# Patient Record
Sex: FEMALE | Race: WHITE | NOT HISPANIC OR LATINO | URBAN - METROPOLITAN AREA
[De-identification: names, ages, dates, MRNs, and addresses within clinical notes are randomized per-mention and may not be internally consistent; named-entity substitution may affect disease eponyms.]

---

## 2020-05-23 ENCOUNTER — Encounter (OUTPATIENT)
Dept: URBAN - METROPOLITAN AREA CLINIC 17 | Facility: CLINIC | Age: 40
End: 2020-05-23
Payer: COMMERCIAL

## 2020-05-23 PROCEDURE — 92004 COMPRE OPH EXAM NEW PT 1/>: CPT | Performed by: OPTOMETRIST

## 2020-05-23 ASSESSMENT — INTRAOCULAR PRESSURE
OS: 19
OD: 20

## 2020-05-26 ENCOUNTER — OFFICE VISIT (OUTPATIENT)
Dept: URBAN - METROPOLITAN AREA CLINIC 33 | Facility: CLINIC | Age: 40
End: 2020-05-26
Payer: COMMERCIAL

## 2020-05-26 ENCOUNTER — SURGERY (OUTPATIENT)
Dept: URBAN - METROPOLITAN AREA SURGERY 15 | Facility: SURGERY | Age: 40
End: 2020-05-26
Payer: COMMERCIAL

## 2020-05-26 PROCEDURE — 92134 CPTRZ OPH DX IMG PST SGM RTA: CPT | Performed by: OPHTHALMOLOGY

## 2020-05-26 PROCEDURE — 67108 REPAIR DETACHED RETINA: CPT | Performed by: OPHTHALMOLOGY

## 2020-05-26 PROCEDURE — 99203 OFFICE O/P NEW LOW 30 MIN: CPT | Performed by: OPHTHALMOLOGY

## 2020-05-26 PROCEDURE — 99214 OFFICE O/P EST MOD 30 MIN: CPT | Performed by: OPHTHALMOLOGY

## 2020-05-26 RX ORDER — PREDNISOLONE ACETATE 10 MG/ML
1 % SUSPENSION/ DROPS OPHTHALMIC
Qty: 10 | Refills: 2 | Status: INACTIVE
Start: 2020-05-26 | End: 2020-09-23

## 2020-05-26 RX ORDER — OFLOXACIN 3 MG/ML
0.3 % SOLUTION/ DROPS OPHTHALMIC
Qty: 10 | Refills: 3 | Status: INACTIVE
Start: 2020-05-26 | End: 2020-09-23

## 2020-05-26 RX ORDER — HYDROCODONE BITARTRATE AND ACETAMINOPHEN 5; 325 MG/1; MG/1
TABLET ORAL
Qty: 6 | Refills: 0 | Status: INACTIVE
Start: 2020-05-26 | End: 2020-09-23

## 2020-05-26 ASSESSMENT — INTRAOCULAR PRESSURE
OS: 18
OD: 20
OS: 18
OD: 20

## 2020-05-26 NOTE — IMPRESSION/PLAN
Impression: Retinal detachment with multiple breaks, right eye: H33.021. Plan: OCT ordered and performed today. Discussed diagnosis with patient. The clinical exam is consistent retinal detachment. To reduce the risk of further vision loss,  surgical intervention is strongly recommended. Discussed treatment options, the various techniques to repair an RD discussed with patient including scleral buckle, silicone oil and pneumatic retinopexy. In addition altitude with gas bubble were discussed. Recommend sx, after a through discussion of surgical R/B/A. The patient understands the potential risks of sx, including (but not limited to) bleeding, pain, infection, loss of vision, loss of eye and possible need for more sx. The patient also understands that pre detachment visual acuity may not return.  The patient elects to proceed with sx RL-2

## 2020-05-27 ENCOUNTER — POST-OPERATIVE VISIT (OUTPATIENT)
Dept: URBAN - METROPOLITAN AREA CLINIC 18 | Facility: CLINIC | Age: 40
End: 2020-05-27

## 2020-05-27 ASSESSMENT — INTRAOCULAR PRESSURE
OD: 20
OS: 15

## 2020-06-02 ENCOUNTER — POST-OPERATIVE VISIT (OUTPATIENT)
Dept: URBAN - METROPOLITAN AREA CLINIC 33 | Facility: CLINIC | Age: 40
End: 2020-06-02

## 2020-06-02 PROCEDURE — 99024 POSTOP FOLLOW-UP VISIT: CPT | Performed by: OPHTHALMOLOGY

## 2020-06-02 ASSESSMENT — INTRAOCULAR PRESSURE
OS: 14
OD: 26

## 2020-07-07 ENCOUNTER — POST-OPERATIVE VISIT (OUTPATIENT)
Dept: URBAN - METROPOLITAN AREA CLINIC 33 | Facility: CLINIC | Age: 40
End: 2020-07-07

## 2020-07-07 DIAGNOSIS — Z09 ENCNTR FOR F/U EXAM AFT TRTMT FOR COND OTH THAN MALIG NEOPLM: Primary | ICD-10-CM

## 2020-07-07 PROCEDURE — 99024 POSTOP FOLLOW-UP VISIT: CPT | Performed by: OPHTHALMOLOGY

## 2020-07-07 ASSESSMENT — INTRAOCULAR PRESSURE
OD: 22
OS: 16

## 2020-07-30 ENCOUNTER — POST-OPERATIVE VISIT (OUTPATIENT)
Dept: URBAN - METROPOLITAN AREA CLINIC 17 | Facility: CLINIC | Age: 40
End: 2020-07-30

## 2020-07-30 PROCEDURE — 99024 POSTOP FOLLOW-UP VISIT: CPT | Performed by: OPHTHALMOLOGY

## 2020-07-30 ASSESSMENT — INTRAOCULAR PRESSURE
OS: 13
OD: 14

## 2020-09-23 ENCOUNTER — OFFICE VISIT (OUTPATIENT)
Dept: URBAN - METROPOLITAN AREA CLINIC 18 | Facility: CLINIC | Age: 40
End: 2020-09-23
Payer: COMMERCIAL

## 2020-09-23 DIAGNOSIS — Z96.1 PRESENCE OF INTRAOCULAR LENS: Chronic | ICD-10-CM

## 2020-09-23 DIAGNOSIS — H04.123 DRY EYE SYNDROME OF BILATERAL LACRIMAL GLANDS: Chronic | ICD-10-CM

## 2020-09-23 PROCEDURE — 92134 CPTRZ OPH DX IMG PST SGM RTA: CPT | Performed by: OPTOMETRIST

## 2020-09-23 PROCEDURE — 99214 OFFICE O/P EST MOD 30 MIN: CPT | Performed by: OPTOMETRIST

## 2020-09-23 ASSESSMENT — INTRAOCULAR PRESSURE
OD: 17
OS: 17

## 2020-09-23 ASSESSMENT — KERATOMETRY
OD: 41.00
OS: 40.75

## 2020-09-23 NOTE — IMPRESSION/PLAN
Impression: Retinal detachment with multiple breaks, right eye: H33.021. Plan: inferior detachment with SRF; next day retinal referral as patient is still 20/40.

## 2020-09-23 NOTE — IMPRESSION/PLAN
Impression: Presence of intraocular lens: Z96.1. Right. Plan: Discussed diagnosis in detail with patient. No treatment is required at this time. Will continue to observe condition and or symptoms. Patient instructed to call if condition gets worse.  Order Refraction per patient request.

## 2020-09-24 ENCOUNTER — OFFICE VISIT (OUTPATIENT)
Dept: URBAN - METROPOLITAN AREA CLINIC 17 | Facility: CLINIC | Age: 40
End: 2020-09-24
Payer: COMMERCIAL

## 2020-09-24 DIAGNOSIS — H33.011 RETINAL DETACHMENT WITH SINGLE BREAK, RIGHT EYE: Primary | ICD-10-CM

## 2020-09-24 PROCEDURE — 92134 CPTRZ OPH DX IMG PST SGM RTA: CPT | Performed by: OPHTHALMOLOGY

## 2020-09-24 PROCEDURE — 92014 COMPRE OPH EXAM EST PT 1/>: CPT | Performed by: OPHTHALMOLOGY

## 2020-09-24 RX ORDER — OFLOXACIN 3 MG/ML
0.3 % SOLUTION/ DROPS OPHTHALMIC
Qty: 5 | Refills: 3 | Status: INACTIVE
Start: 2020-09-24 | End: 2020-10-22

## 2020-09-24 RX ORDER — PREDNISOLONE ACETATE 10 MG/ML
1 % SUSPENSION/ DROPS OPHTHALMIC
Qty: 10 | Refills: 5 | Status: INACTIVE
Start: 2020-09-24 | End: 2020-11-03

## 2020-09-24 ASSESSMENT — INTRAOCULAR PRESSURE
OS: 18
OD: 21

## 2020-09-24 NOTE — IMPRESSION/PLAN
Impression: Retinal detachment with single break, right eye: H33.011. Right. Condition: new problem addtl w/u needed. Vision: vision affected. s/p Repair of RD OD w/gas 05/26/2020 w/Dr Riley Plan: Discussed diagnosis in detail with patient. Discussed risks of progression. Surgical treatment is recommended to repair the retina PPVx RIGHT EYE. Surgical risks and benefits were discussed, explained and understood by patient. Unable to tell how much vision will be recovered. Discussed possible gas bubble and post-op care: no traveling, flying or high altitude for approximately 6 - 8 weeks. Most likely will use Pefl due to the location of RD. Advise patient she will need additional surgery OD to remove the heavy liquid in the future once the retina is stable. All questions answered. Patient elects to proceed with recommendation. RL1. Educational material provided to patient. Will schedule surgery for next Tuesday 09/29/2020. Erx Prednisolone and Ofloxacin to patient's pharmacy. Optos OD shows shallow inf RD and OCT OD shows fluid, mac on. Advise patient that she will not be able to see after the patch is removed on 1 day post-op. Patient asked if she can go to [facility], trip has been planned for a while for her and friends to relax an not to party. Ok to go as long as she is resting and/or not harsh head movement.

## 2020-09-29 ENCOUNTER — SURGERY (OUTPATIENT)
Dept: URBAN - METROPOLITAN AREA SURGERY 11 | Facility: SURGERY | Age: 40
End: 2020-09-29
Payer: COMMERCIAL

## 2020-09-29 PROCEDURE — 67041 VIT FOR MACULAR PUCKER: CPT | Performed by: OPHTHALMOLOGY

## 2020-09-30 ENCOUNTER — POST-OPERATIVE VISIT (OUTPATIENT)
Dept: URBAN - METROPOLITAN AREA CLINIC 17 | Facility: CLINIC | Age: 40
End: 2020-09-30
Payer: COMMERCIAL

## 2020-09-30 PROCEDURE — 99024 POSTOP FOLLOW-UP VISIT: CPT | Performed by: OPTOMETRIST

## 2020-09-30 ASSESSMENT — INTRAOCULAR PRESSURE
OD: 19
OD: 25
OS: 17

## 2020-09-30 NOTE — IMPRESSION/PLAN
Impression: S/P retinal detachment w/VTX(PPVx), EL, ERMx, perfluoron OD - 1 Day. Encounter for surgical aftercare following surgery on a sense organ  Z48.810. Post operative instructions reviewed - If patient has increase in pain recommend calling to be seen sooner.  Plan: RTC in 1 week for PO2 --Continue Ofloxacin 0.3% QID OD x week then d/c
--Continue Prednisolone acetate 1% QID OD until bottle is gone

## 2020-10-07 ENCOUNTER — POST-OPERATIVE VISIT (OUTPATIENT)
Dept: URBAN - METROPOLITAN AREA CLINIC 17 | Facility: CLINIC | Age: 40
End: 2020-10-07
Payer: COMMERCIAL

## 2020-10-07 PROCEDURE — 99024 POSTOP FOLLOW-UP VISIT: CPT | Performed by: OPTOMETRIST

## 2020-10-07 ASSESSMENT — INTRAOCULAR PRESSURE
OS: 14
OD: 18

## 2020-10-07 NOTE — IMPRESSION/PLAN
Impression: S/P retinal detachment w/VTX(PPVx), EL, ERMx, perfluoron OD - 8 Days. Encounter for surgical aftercare following surgery on a sense organ  Z48.810.  Condition is improving - Plan: RTC in 4-6 weeks with Dr. Gini Fleming 
--Discontinue Ofloxacin 0.3%
--Continue Prednisolone acetate 1% QID until bottle and refill is gone

## 2020-10-22 ENCOUNTER — POST-OPERATIVE VISIT (OUTPATIENT)
Dept: URBAN - METROPOLITAN AREA CLINIC 17 | Facility: CLINIC | Age: 40
End: 2020-10-22
Payer: COMMERCIAL

## 2020-10-22 PROCEDURE — 99024 POSTOP FOLLOW-UP VISIT: CPT | Performed by: OPTOMETRIST

## 2020-10-22 ASSESSMENT — INTRAOCULAR PRESSURE
OD: 22
OS: 14

## 2020-10-22 NOTE — IMPRESSION/PLAN
Impression: S/P retinal detachment w/VTX(PPVx), EL, ERMx, perfluoron OD - 23 Days. Encounter for surgical aftercare following surgery on a sense organ  Z48.810. Post operative instructions reviewed - Consulted with Dr. Charli Barrera in office. Continue care as directed. Pressure may be related to sinus. IOP doing well today. Patient instructed to call if pain increases.  Plan: Keep appt with Dr. Charli Barrera --Continue Prednisolone acetate 1% QID OD until bottle and refill is gone

## 2020-11-03 ENCOUNTER — POST-OPERATIVE VISIT (OUTPATIENT)
Dept: URBAN - METROPOLITAN AREA CLINIC 17 | Facility: CLINIC | Age: 40
End: 2020-11-03
Payer: COMMERCIAL

## 2020-11-03 DIAGNOSIS — Z48.810 ENCOUNTER FOR SURGICAL AFTERCARE FOLLOWING SURGERY ON A SENSE ORGAN: Primary | ICD-10-CM

## 2020-11-03 PROCEDURE — 99024 POSTOP FOLLOW-UP VISIT: CPT | Performed by: OPHTHALMOLOGY

## 2020-11-03 RX ORDER — OFLOXACIN 3 MG/ML
0.3 % SOLUTION/ DROPS OPHTHALMIC
Qty: 5 | Refills: 3 | Status: INACTIVE
Start: 2020-11-03 | End: 2020-11-11

## 2020-11-03 ASSESSMENT — INTRAOCULAR PRESSURE
OS: 16
OD: 26

## 2020-11-03 NOTE — IMPRESSION/PLAN
Impression: S/P retinal detachment w/VTX(PPVx), EL, ERMx, perfluoron OD - 35 Days. Encounter for surgical aftercare following surgery on a sense organ  Z48.810. Excellent post op course   Post operative instructions reviewed - Condition is improving - Plan: Exam of the right eye today shows retina fully reattached with PFO. Discussed diagnosis in detail with patient. Discussed risks of progression. Surgical treatment is recommended to remove PFO for vision improvement 27 g PPVx w/ PFO removal RIGHT EYE. Surgical risks and benefits were discussed, explained and understood by patient. All questions answered. RL1. Patient elects to proceed with recommendation. OCT performed today: retina attached with PFO OD. Erxed drops to pharmacy on file Ofloxacin and Prednisolone

## 2020-11-11 ENCOUNTER — SURGERY (OUTPATIENT)
Dept: URBAN - METROPOLITAN AREA SURGERY 11 | Facility: SURGERY | Age: 40
End: 2020-11-11
Payer: COMMERCIAL

## 2020-11-11 ENCOUNTER — POST-OPERATIVE VISIT (OUTPATIENT)
Dept: URBAN - METROPOLITAN AREA CLINIC 18 | Facility: CLINIC | Age: 40
End: 2020-11-11
Payer: COMMERCIAL

## 2020-11-11 ENCOUNTER — POST-OPERATIVE VISIT (OUTPATIENT)
Dept: URBAN - METROPOLITAN AREA CLINIC 23 | Facility: CLINIC | Age: 40
End: 2020-11-11
Payer: COMMERCIAL

## 2020-11-11 ENCOUNTER — POST-OPERATIVE VISIT (OUTPATIENT)
Dept: URBAN - METROPOLITAN AREA CLINIC 17 | Facility: CLINIC | Age: 40
End: 2020-11-11
Payer: COMMERCIAL

## 2020-11-11 PROCEDURE — 67036 REMOVAL OF INNER EYE FLUID: CPT | Performed by: OPHTHALMOLOGY

## 2020-11-11 PROCEDURE — 99024 POSTOP FOLLOW-UP VISIT: CPT | Performed by: OPHTHALMOLOGY

## 2020-11-11 PROCEDURE — 99214 OFFICE O/P EST MOD 30 MIN: CPT | Performed by: OPHTHALMOLOGY

## 2020-11-11 PROCEDURE — 99024 POSTOP FOLLOW-UP VISIT: CPT | Performed by: OPTOMETRIST

## 2020-11-11 ASSESSMENT — INTRAOCULAR PRESSURE
OD: 41
OS: 20
OS: 13
OS: 13
OS: 20
OD: 41
OD: 59
OS: 13
OD: 59
OS: 20
OS: 20
OD: 41
OD: 59
OD: 41
OD: 59
OS: 13

## 2020-11-11 NOTE — IMPRESSION/PLAN
Impression: S/P retinal detachment w/VTX(PPVx), EL, ERMx, perfluoron OD - 43 Days. Encounter for surgical aftercare following surgery on a sense organ  Z48.810. Excellent post op course   Post operative instructions reviewed - Condition is improving - Plan: Exam of the right eye shows elevated IOP, retina appears fully reattached with PFO. Discussed with patient that the heavy liquid is probably the cause of the elevated pressures. Discussed diagnosis in detail with patient. Discussed risks of progression. Emergent surgical treatment is recommended to remove PFO, to help lower pressures and for vision improvement PPVx RIGHT EYE. Surgical risks and benefits were discussed, explained and understood by patient. Discussed with patient that if the pressures are still elevated after the heavy liquid is removed it can be controlled with drops or possibly an additional surgery may be needed to place a valve to help control the pressure with Dr. Genesis Ruby. All questions answered. RL1. Patient elects to proceed with recommendation. OCT performed today: retina attached w/ PFO and  Optos OD retina attached with PFO. Educational material provided to patient.

## 2020-11-11 NOTE — IMPRESSION/PLAN
Impression: S/P PPVX ERMX OD - 43 Days. Encounter for surgical aftercare following surgery on a sense organ  Z48.810. Partial angle closure OD; possible steroid responder; start taper on pred weekly. same day referral for emergency LPI OD Plan: Same day LPI with Dr Sydney Thomas.  --Taper Prednisolone acetate 1% TID x 1 wk, BID x 1wk, QD x 1wk, then d/c

## 2020-11-11 NOTE — IMPRESSION/PLAN
Impression: S/P PPVX ERMX OD - 43 Days. Encounter for surgical aftercare following surgery on a sense organ  Z48.810. Partial angle closure OD; possible steroid responder; Plan: Discussed diagnosis, explained and understood by patient. Start combigan od every 1/2 hour and start diamox 500mg (given to patient in cllinic) . No eating or drinking. Discussed side effects of glaucoma meds.  See Dr. Elio Ferris today for Oil removal/shunt valve today at [facility]

## 2020-11-12 ENCOUNTER — POST-OPERATIVE VISIT (OUTPATIENT)
Dept: URBAN - METROPOLITAN AREA CLINIC 17 | Facility: CLINIC | Age: 40
End: 2020-11-12
Payer: COMMERCIAL

## 2020-11-12 PROCEDURE — 99024 POSTOP FOLLOW-UP VISIT: CPT | Performed by: OPTOMETRIST

## 2020-11-12 ASSESSMENT — INTRAOCULAR PRESSURE
OD: 7
OS: 14

## 2020-11-12 NOTE — IMPRESSION/PLAN
Impression: S/P PPVx 27g; AFX (Air Fluid Gas Exchange); Paracentesis/Anterior [employer]; Intraocular Removal of vitreous substitutes OD - 1 Day. Encounter for surgical aftercare following surgery on a sense organ  Z48.810. Post operative instructions reviewed - Plan: RTC in 1 week for PO2 --Continue Ofloxacin 0.3% QID OD x 1 week then d/c
--Continue Prednisolone acetate 1% QID OD x 6-8 weeks.  
--Discontinue Combigan

## 2020-11-16 ENCOUNTER — POST-OPERATIVE VISIT (OUTPATIENT)
Dept: URBAN - METROPOLITAN AREA CLINIC 33 | Facility: CLINIC | Age: 40
End: 2020-11-16

## 2020-11-16 PROCEDURE — 99024 POSTOP FOLLOW-UP VISIT: CPT | Performed by: OPHTHALMOLOGY

## 2020-11-16 ASSESSMENT — INTRAOCULAR PRESSURE
OS: 13
OD: 13

## 2020-11-16 NOTE — IMPRESSION/PLAN
Impression: S/P PPVx 27g; AFX (Air Fluid Gas Exchange); Paracentesis/Anterior [employer]; Intraocular Removal of vitreous substitutes OD - 5 Days. Encounter for surgical aftercare following surgery on a sense organ  Z48.810. Excellent post op course   Post operative instructions reviewed - Condition is improving - OCT OD shows retina attached centrally, no active edema no ERM Plan: Excellent post op course   Post operative instructions reviewed - Condition is improving -  Continue Ofloxacin QID x 2 days , then D/C   Prednisolone QID x 4 weeks , then D/C

## 2020-11-20 ENCOUNTER — POST-OPERATIVE VISIT (OUTPATIENT)
Dept: URBAN - METROPOLITAN AREA CLINIC 17 | Facility: CLINIC | Age: 40
End: 2020-11-20
Payer: COMMERCIAL

## 2020-11-20 PROCEDURE — 99024 POSTOP FOLLOW-UP VISIT: CPT | Performed by: OPTOMETRIST

## 2020-11-20 ASSESSMENT — INTRAOCULAR PRESSURE: OD: 16

## 2020-11-20 NOTE — IMPRESSION/PLAN
Impression: S/P PPVx 27g; AFX (Air Fluid Gas Exchange); Paracentesis/Anterior [employer]; Intraocular Removal of vitreous substitutes OD - 9 Days. Encounter for surgical aftercare following surgery on a sense organ  Z48.810. Reviewed MAC OCT and OPTOS, no new RD. Call if experiencing pain or persistent veil. Plan: Keep appt with Dr. Leanne Owusu. --Continue Prednisolone acetate 1% QID OD 
--Advised patient to use artificial tears for comfort.

## 2021-01-04 ENCOUNTER — POST-OPERATIVE VISIT (OUTPATIENT)
Dept: URBAN - METROPOLITAN AREA CLINIC 17 | Facility: CLINIC | Age: 41
End: 2021-01-04
Payer: COMMERCIAL

## 2021-01-04 PROCEDURE — 99024 POSTOP FOLLOW-UP VISIT: CPT | Performed by: OPHTHALMOLOGY

## 2021-01-04 RX ORDER — BROMFENAC SODIUM 0.7 MG/ML
0.07 % SOLUTION/ DROPS OPHTHALMIC
Qty: 3 | Refills: 5 | Status: INACTIVE
Start: 2021-01-04 | End: 2021-08-10

## 2021-01-04 RX ORDER — KETOROLAC TROMETHAMINE 5 MG/ML
0.5 % SOLUTION OPHTHALMIC
Qty: 5 | Refills: 5 | Status: INACTIVE
Start: 2021-01-04 | End: 2021-03-25

## 2021-01-04 ASSESSMENT — INTRAOCULAR PRESSURE
OS: 17
OD: 22

## 2021-01-04 NOTE — IMPRESSION/PLAN
Impression: S/P PPVx 27g; AFX (Air Fluid Gas Exchange); Paracentesis/Anterior [employer]; Intraocular Removal of vitreous substitutes OD - 54 Days. Encounter for surgical aftercare following surgery on a sense organ  Z48.810. Excellent post op course   Post operative instructions reviewed - Condition is improving - Plan: Due to Coronavirus COVID-19 pandemic and National Emergency, deferred Slit Lamp examination. Findings are based on OCT and Optos. OCT shows minimal area of swelling centrally OD and Optos shows retina fully reattached OD. Discussed with patient there is still some swelling in the eye, recommend patient start Prolensa QD OD (Ketorolac QID OD okay if Prolensa not covered by insurance) to help reduce the swelling. Recommend a retina follow - up in 1 mos. 
Erxed Prolensa and Ketorolac to pharmacy on file

## 2021-02-17 ENCOUNTER — OFFICE VISIT (OUTPATIENT)
Dept: URBAN - METROPOLITAN AREA CLINIC 17 | Facility: CLINIC | Age: 41
End: 2021-02-17
Payer: COMMERCIAL

## 2021-02-17 PROCEDURE — 92134 CPTRZ OPH DX IMG PST SGM RTA: CPT | Performed by: OPHTHALMOLOGY

## 2021-02-17 PROCEDURE — 99214 OFFICE O/P EST MOD 30 MIN: CPT | Performed by: OPHTHALMOLOGY

## 2021-02-17 ASSESSMENT — INTRAOCULAR PRESSURE
OS: 19
OD: 20

## 2021-02-17 NOTE — IMPRESSION/PLAN
Impression: Retinal detachment with single break, right eye: H33.011. Right. Condition: new problem addtl w/u needed. Vision: vision affected. s/p PPVX for Removal of Perf od 11/11/2020 w/Dr Garces
s/p PPVX for Repair of RD OD w/Perfl 09/29/2020 w/Dr Garces
s/p Repair of RD OD w/gas 05/26/2020 w/Dr Riley Plan: Discussed diagnosis in detail with patient. Patient states she sees a little bubble in the center of her vision OD causing a headache at the end of the day. Discussed treatment options with patient. Recommend paracentesis RIGHT EYE to remove the residual PFO droplet from the right eye in the O.R. Discussed the risks and benefits of procedure. All questions answered. Patient elects to proceed with recommendation. Continue using Prolensa OD QD. Patient has refills for Prednisolone and Ofloxacin at her pharmacy. OCT and Optos OD shows a decrease in swelling.

## 2021-03-23 ENCOUNTER — SURGERY (OUTPATIENT)
Dept: URBAN - METROPOLITAN AREA SURGERY 7 | Facility: SURGERY | Age: 41
End: 2021-03-23
Payer: COMMERCIAL

## 2021-03-23 PROCEDURE — 65800 DRAINAGE OF EYE: CPT | Performed by: OPHTHALMOLOGY

## 2021-03-24 ENCOUNTER — POST-OPERATIVE VISIT (OUTPATIENT)
Dept: URBAN - METROPOLITAN AREA CLINIC 17 | Facility: CLINIC | Age: 41
End: 2021-03-24
Payer: COMMERCIAL

## 2021-03-24 PROCEDURE — 99024 POSTOP FOLLOW-UP VISIT: CPT | Performed by: OPTOMETRIST

## 2021-03-24 ASSESSMENT — INTRAOCULAR PRESSURE
OD: 11
OS: 16

## 2021-03-24 NOTE — IMPRESSION/PLAN
Impression: S/P Paracentesis release aqueous 61370 OD - 1 Day. Encounter for surgical aftercare following surgery on a sense organ  Z48.810. Obtained MAC OCT and OPTOS. Consulted with Dr. Elio Ferris over the phone. Patient to see him tomorrow. Plan: Appt with Dr. Elio Ferris tomorrow --Start Ofloxacin QID OD
--Continue Prolensa QD OD
--Start Prednisolone Acetate [id number] OD

## 2021-03-25 ENCOUNTER — POST-OPERATIVE VISIT (OUTPATIENT)
Dept: URBAN - METROPOLITAN AREA CLINIC 23 | Facility: CLINIC | Age: 41
End: 2021-03-25

## 2021-03-25 PROCEDURE — 99024 POSTOP FOLLOW-UP VISIT: CPT | Performed by: OPHTHALMOLOGY

## 2021-03-25 ASSESSMENT — INTRAOCULAR PRESSURE
OD: 2
OS: 16

## 2021-03-25 NOTE — IMPRESSION/PLAN
Impression: S/P Paracentesis release aqueous 75435 OD - 2 Days. Encounter for surgical aftercare following surgery on a sense organ  Z48.810. Post operative instructions reviewed - Condition is improving - Plan: Due to Coronavirus COVID-19 pandemic and National Emergency, minimal Slit Lamp examination performed. Exam OD shows the retina appears fully attached. OCT OD shows the macula is attached centrally, wavy lines due to low IOP and Optos shows clear view, retina appears fully attached. No treatment is required at this time. Recommend a retina follow - up in 1 week in [place]. Advised patient to continue using all eye drops, call if there is any sudden changes.  --Continue all meds: Prednisolone Q2H OD, Ofloxacin QID OD, and Prolensa QD OD

## 2021-03-30 ENCOUNTER — POST-OPERATIVE VISIT (OUTPATIENT)
Dept: URBAN - METROPOLITAN AREA CLINIC 17 | Facility: CLINIC | Age: 41
End: 2021-03-30
Payer: COMMERCIAL

## 2021-03-30 PROCEDURE — 99024 POSTOP FOLLOW-UP VISIT: CPT | Performed by: OPTOMETRIST

## 2021-03-30 ASSESSMENT — INTRAOCULAR PRESSURE
OD: 20
OS: 15

## 2021-03-30 NOTE — IMPRESSION/PLAN
Impression: S/P Paracentesis release aqueous 45565 OD - 7 Days. Encounter for surgical aftercare following surgery on a sense organ  Z48.810. Obtained Mac OCT,  WNL OU. Plan: Keep appointment with Dr. Mary Oliveira OD:
--Discontinue Ofloxacin -- Pred-Acetate QID 
--Continue Prolensa QD

## 2021-04-14 ENCOUNTER — POST-OPERATIVE VISIT (OUTPATIENT)
Dept: URBAN - METROPOLITAN AREA CLINIC 17 | Facility: CLINIC | Age: 41
End: 2021-04-14
Payer: COMMERCIAL

## 2021-04-14 PROCEDURE — 99024 POSTOP FOLLOW-UP VISIT: CPT | Performed by: OPHTHALMOLOGY

## 2021-04-14 ASSESSMENT — INTRAOCULAR PRESSURE
OS: 17
OD: 25

## 2021-04-14 NOTE — IMPRESSION/PLAN
Impression: S/P 25G PPVx/AC WASH OUT/PFO Removal OD - 22 Days. Encounter for surgical aftercare following surgery on a sense organ  Z48.810. Excellent post op course   Post operative instructions reviewed - Condition is improving - Plan: Due to Coronavirus COVID-19 pandemic and National Emergency, deferred Slit Lamp examination. Findings are based on OCT and Optos. OCT shows stable macula no active edema, no ERM OD and Optos shows stable retina attached, no ERM no edema OD. No treatment is require at this time. Recommend a retina follow - up in 3 weeks.  --Discontinue Prednisolone acetate 1%
-- Continue Prolensa QD OD

## 2021-05-12 ENCOUNTER — POST-OPERATIVE VISIT (OUTPATIENT)
Dept: URBAN - METROPOLITAN AREA CLINIC 17 | Facility: CLINIC | Age: 41
End: 2021-05-12
Payer: COMMERCIAL

## 2021-05-12 PROCEDURE — 99024 POSTOP FOLLOW-UP VISIT: CPT | Performed by: OPHTHALMOLOGY

## 2021-05-12 ASSESSMENT — INTRAOCULAR PRESSURE
OS: 19
OD: 21

## 2021-05-12 NOTE — IMPRESSION/PLAN
Impression: S/P 25G PPVx/AC WASH OUT/PFO Removal OD - 50 Days. Encounter for surgical aftercare following surgery on a sense organ  Z48.810. Excellent post op course   Post operative instructions reviewed - Condition is improving - OCT and Optos OD is stable. Plan: Excellent post op course   Post operative instructions reviewed - Condition is improving - Recommend follow up in 3 months to reassess.  -- Finish Prolensa QD OD then D/C

## 2021-08-10 ENCOUNTER — OFFICE VISIT (OUTPATIENT)
Dept: URBAN - METROPOLITAN AREA CLINIC 17 | Facility: CLINIC | Age: 41
End: 2021-08-10
Payer: COMMERCIAL

## 2021-08-10 PROCEDURE — 99213 OFFICE O/P EST LOW 20 MIN: CPT | Performed by: OPHTHALMOLOGY

## 2021-08-10 PROCEDURE — 92134 CPTRZ OPH DX IMG PST SGM RTA: CPT | Performed by: OPHTHALMOLOGY

## 2021-08-10 RX ORDER — BROMFENAC SODIUM 0.7 MG/ML
0.07 % SOLUTION/ DROPS OPHTHALMIC
Qty: 3 | Refills: 5 | Status: ACTIVE
Start: 2021-08-10

## 2021-08-10 ASSESSMENT — INTRAOCULAR PRESSURE
OD: 14
OS: 15

## 2021-08-10 NOTE — IMPRESSION/PLAN
Impression: Retinal detachment with multiple breaks, right eye: H33.021. Right. Condition: improving.
s/p 25G PPVx/AC [address] OUT/PFO Removal OD 03/23/2021 Plan: Discussed diagnosis in detail with patient. No treatment is required at this time based on exam and diagnostic testing. Recommend observation for now. Optos shows retina is fully attached. OCT shows slight edema centrally, recommend starting Prolensa QD OD to help reduce with swelling. ERX'd to [facility] drug and informed patient.

## 2021-10-05 ENCOUNTER — OFFICE VISIT (OUTPATIENT)
Dept: URBAN - METROPOLITAN AREA CLINIC 17 | Facility: CLINIC | Age: 41
End: 2021-10-05
Payer: COMMERCIAL

## 2021-10-05 DIAGNOSIS — H33.021 RETINAL DETACHMENT WITH MULTIPLE BREAKS, RIGHT EYE: Primary | ICD-10-CM

## 2021-10-05 PROCEDURE — 99213 OFFICE O/P EST LOW 20 MIN: CPT | Performed by: OPHTHALMOLOGY

## 2021-10-05 PROCEDURE — 92134 CPTRZ OPH DX IMG PST SGM RTA: CPT | Performed by: OPHTHALMOLOGY

## 2021-10-05 ASSESSMENT — INTRAOCULAR PRESSURE
OS: 16
OD: 17

## 2021-10-05 NOTE — IMPRESSION/PLAN
Impression: Retinal detachment with multiple breaks, right eye: H33.021. Right. Condition: improving.
s/p 25G PPVx/AC [address] OUT/PFO Removal OD 03/23/2021 Plan: Discussed diagnosis in detail with patient. No treatment is required at this time based on exam and diagnostic testing. Recommend observation for now. Optos shows retina is fully attached. OCT shows decrease in edema, recommend decrease Prolensa QD every other day OD to help reduce with swelling. Recommend 2 month followup to discuss d/c of Prolensa.

## 2021-11-30 ENCOUNTER — OFFICE VISIT (OUTPATIENT)
Dept: URBAN - METROPOLITAN AREA CLINIC 17 | Facility: CLINIC | Age: 41
End: 2021-11-30
Payer: COMMERCIAL

## 2021-11-30 PROCEDURE — 92134 CPTRZ OPH DX IMG PST SGM RTA: CPT | Performed by: OPHTHALMOLOGY

## 2021-11-30 PROCEDURE — 99213 OFFICE O/P EST LOW 20 MIN: CPT | Performed by: OPHTHALMOLOGY

## 2021-11-30 ASSESSMENT — INTRAOCULAR PRESSURE
OS: 15
OD: 19

## 2021-11-30 NOTE — IMPRESSION/PLAN
Impression: s/p PPVX for Repair of Retinal detachment with multiple breaks, right eye : H33.021. Right. Condition: resolved with surgery. Vision: stable. s/p 25G PPVx/AC [address] OUT/PFO Removal OD 03/23/2021
s/p PPVX for Removal of Perf od 11/11/2020 w/Dr Garces
s/p PPVX for Repair of RD OD w/Perfl 09/29/2020 w/Dr Garces
s/p Repair of RD OD w/gas 05/26/2020 w/Dr Riley Plan: Due to Coronavirus COVID-19 pandemic and National Emergency, deferred Slit Lamp examination. Findings are based on diagnostic tests. OCT shows further decrease in CMT OD and Optos shows the retina is attached OD. No additional treatment is require at this time, patient can d/c Prolensa. Recommend a retina follow - up in 4 - 6 mos.

## 2022-04-13 ENCOUNTER — OFFICE VISIT (OUTPATIENT)
Dept: URBAN - METROPOLITAN AREA CLINIC 17 | Facility: CLINIC | Age: 42
End: 2022-04-13
Payer: COMMERCIAL

## 2022-04-13 DIAGNOSIS — H33.021 RETINAL DETACHMENT WITH MULTIPLE BREAKS, RIGHT EYE: Primary | ICD-10-CM

## 2022-04-13 PROCEDURE — 99213 OFFICE O/P EST LOW 20 MIN: CPT | Performed by: OPHTHALMOLOGY

## 2022-04-13 PROCEDURE — 92134 CPTRZ OPH DX IMG PST SGM RTA: CPT | Performed by: OPHTHALMOLOGY

## 2022-04-13 NOTE — IMPRESSION/PLAN
Impression: s/p PPVX for Repair of Retinal detachment with multiple breaks, right eye : H33.021. Right. Condition: resolved with surgery. Vision: stable. s/p 25G PPVx/AC [address] OUT/PFO Removal OD 03/23/2021
s/p PPVX for Removal of Perf od 11/11/2020 w/Dr Garces
s/p PPVX for Repair of RD OD w/Perfl 09/29/2020 w/Dr Garces
s/p Repair of RD OD w/gas 05/26/2020 w/Dr Riley Plan: Discussed diagnosis in detail with patient. Exam OD shows the retina is attached and stable. Explained the bubbles she sees is PFO droplets, IOP is wnl OU. Recommend to use artificial tears OU QID for comfort. Patient can follow - up in 4 mos w/Dr Jose Mcginnis to reassess the retina.

## 2022-08-08 ENCOUNTER — OFFICE VISIT (OUTPATIENT)
Dept: URBAN - METROPOLITAN AREA CLINIC 17 | Facility: CLINIC | Age: 42
End: 2022-08-08
Payer: COMMERCIAL

## 2022-08-08 DIAGNOSIS — H59.811 CHORIORETINAL SCARS AFTER SURGERY FOR DETACHMENT, RIGHT EYE: Primary | ICD-10-CM

## 2022-08-08 PROCEDURE — 99213 OFFICE O/P EST LOW 20 MIN: CPT | Performed by: OPTOMETRIST

## 2022-08-08 ASSESSMENT — INTRAOCULAR PRESSURE
OD: 15
OS: 15

## 2022-08-08 NOTE — IMPRESSION/PLAN
Impression: Chorioretinal scars after surgery for detachment, right eye: X51.257. Condition: established, stable. Plan: Stable. Retina attached 360 degr. Monitor. Call if Va worsens. RD precautions d/w pt. F/u 1 yr or sooner PRN.

## 2023-08-08 ENCOUNTER — OFFICE VISIT (OUTPATIENT)
Dept: URBAN - METROPOLITAN AREA CLINIC 18 | Facility: CLINIC | Age: 43
End: 2023-08-08
Payer: COMMERCIAL

## 2023-08-08 DIAGNOSIS — Z96.1 PRESENCE OF INTRAOCULAR LENS: ICD-10-CM

## 2023-08-08 DIAGNOSIS — H59.811 CHORIORETINAL SCARS AFTER SURGERY FOR DETACHMENT, RIGHT EYE: Primary | ICD-10-CM

## 2023-08-08 DIAGNOSIS — H04.123 DRY EYE SYNDROME OF BILATERAL LACRIMAL GLANDS: ICD-10-CM

## 2023-08-08 PROCEDURE — 99213 OFFICE O/P EST LOW 20 MIN: CPT | Performed by: OPTOMETRIST

## 2023-08-08 ASSESSMENT — INTRAOCULAR PRESSURE
OS: 23
OD: 22